# Patient Record
Sex: MALE | Race: OTHER | NOT HISPANIC OR LATINO | URBAN - METROPOLITAN AREA
[De-identification: names, ages, dates, MRNs, and addresses within clinical notes are randomized per-mention and may not be internally consistent; named-entity substitution may affect disease eponyms.]

---

## 2023-01-31 ENCOUNTER — OUTPATIENT (OUTPATIENT)
Dept: OUTPATIENT SERVICES | Facility: HOSPITAL | Age: 38
LOS: 1 days | End: 2023-01-31
Payer: OTHER MISCELLANEOUS

## 2023-01-31 DIAGNOSIS — Z01.818 ENCOUNTER FOR OTHER PREPROCEDURAL EXAMINATION: ICD-10-CM

## 2023-01-31 LAB
ANION GAP SERPL CALC-SCNC: 9 MMOL/L — SIGNIFICANT CHANGE UP (ref 5–17)
APTT BLD: 34.2 SEC — SIGNIFICANT CHANGE UP (ref 27.5–35.5)
BUN SERPL-MCNC: 10 MG/DL — SIGNIFICANT CHANGE UP (ref 7–23)
CALCIUM SERPL-MCNC: 9.5 MG/DL — SIGNIFICANT CHANGE UP (ref 8.4–10.5)
CHLORIDE SERPL-SCNC: 106 MMOL/L — SIGNIFICANT CHANGE UP (ref 96–108)
CO2 SERPL-SCNC: 26 MMOL/L — SIGNIFICANT CHANGE UP (ref 22–31)
CREAT SERPL-MCNC: 0.93 MG/DL — SIGNIFICANT CHANGE UP (ref 0.5–1.3)
EGFR: 108 ML/MIN/1.73M2 — SIGNIFICANT CHANGE UP
GLUCOSE SERPL-MCNC: 110 MG/DL — HIGH (ref 70–99)
HCT VFR BLD CALC: 45 % — SIGNIFICANT CHANGE UP (ref 39–50)
HGB BLD-MCNC: 14.9 G/DL — SIGNIFICANT CHANGE UP (ref 13–17)
INR BLD: 1.12 — SIGNIFICANT CHANGE UP (ref 0.88–1.16)
MCHC RBC-ENTMCNC: 29.3 PG — SIGNIFICANT CHANGE UP (ref 27–34)
MCHC RBC-ENTMCNC: 33.1 GM/DL — SIGNIFICANT CHANGE UP (ref 32–36)
MCV RBC AUTO: 88.4 FL — SIGNIFICANT CHANGE UP (ref 80–100)
NRBC # BLD: 0 /100 WBCS — SIGNIFICANT CHANGE UP (ref 0–0)
PLATELET # BLD AUTO: 417 K/UL — HIGH (ref 150–400)
POTASSIUM SERPL-MCNC: 4 MMOL/L — SIGNIFICANT CHANGE UP (ref 3.5–5.3)
POTASSIUM SERPL-SCNC: 4 MMOL/L — SIGNIFICANT CHANGE UP (ref 3.5–5.3)
PROTHROM AB SERPL-ACNC: 13.3 SEC — SIGNIFICANT CHANGE UP (ref 10.5–13.4)
RBC # BLD: 5.09 M/UL — SIGNIFICANT CHANGE UP (ref 4.2–5.8)
RBC # FLD: 12.8 % — SIGNIFICANT CHANGE UP (ref 10.3–14.5)
SODIUM SERPL-SCNC: 141 MMOL/L — SIGNIFICANT CHANGE UP (ref 135–145)
WBC # BLD: 9.67 K/UL — SIGNIFICANT CHANGE UP (ref 3.8–10.5)
WBC # FLD AUTO: 9.67 K/UL — SIGNIFICANT CHANGE UP (ref 3.8–10.5)

## 2023-01-31 PROCEDURE — 85610 PROTHROMBIN TIME: CPT

## 2023-01-31 PROCEDURE — 80048 BASIC METABOLIC PNL TOTAL CA: CPT

## 2023-01-31 PROCEDURE — 93010 ELECTROCARDIOGRAM REPORT: CPT

## 2023-01-31 PROCEDURE — 93005 ELECTROCARDIOGRAM TRACING: CPT

## 2023-01-31 PROCEDURE — 85027 COMPLETE CBC AUTOMATED: CPT

## 2023-01-31 PROCEDURE — 85730 THROMBOPLASTIN TIME PARTIAL: CPT

## 2023-02-02 ENCOUNTER — TRANSCRIPTION ENCOUNTER (OUTPATIENT)
Age: 38
End: 2023-02-02

## 2023-02-02 RX ORDER — POVIDONE-IODINE 5 %
1 AEROSOL (ML) TOPICAL ONCE
Refills: 0 | Status: COMPLETED | OUTPATIENT
Start: 2023-02-03 | End: 2023-02-03

## 2023-02-02 NOTE — ASU PATIENT PROFILE, ADULT - NSICDXPASTMEDICALHX_GEN_ALL_CORE_FT
PAST MEDICAL HISTORY:  Lumbar herniated disc      PAST MEDICAL HISTORY:  HLD (hyperlipidemia)     HTN (hypertension)     Lumbar herniated disc

## 2023-02-02 NOTE — ASU PATIENT PROFILE, ADULT - FALL HARM RISK - UNIVERSAL INTERVENTIONS
Bed in lowest position, wheels locked, appropriate side rails in place/Call bell, personal items and telephone in reach/Instruct patient to call for assistance before getting out of bed or chair/Non-slip footwear when patient is out of bed/[place] to call system/Physically safe environment - no spills, clutter or unnecessary equipment/Purposeful Proactive Rounding/Room/bathroom lighting operational, light cord in reach

## 2023-02-02 NOTE — ASU PATIENT PROFILE, ADULT - FALL HARM RISK - ATTEMPT OOB
----- Message from Mary Monet MD sent at 11/15/2022 11:33 AM CST -----  Benign pathology report   No

## 2023-02-02 NOTE — ASU PATIENT PROFILE, ADULT - NS SC CAGE ALCOHOL EYE OPENER
-- DO NOT REPLY / DO NOT REPLY ALL --  -- Message is from the [facility]--    Patient is requesting a medication refill - medication is on active medication list   ID [id number]:   Patient is requesting the Dr. Dustin Rolon to give her a call back to regarding discharge from the hospital.     Patient is currently OUT of the requested medication.    Was Medication Pended?  Yes.    Rx Name and Dose:    HYDROcodone-acetaminophen (NORCO) 5-325 MG per tablet    Duration: 30 days    Pharmacy  [id number] [address]    Patient confirmed the above pharmacy as correct?  Yes    Caller Information       Type Contact Phone    08/26/2021 01:05 PM CDT Phone (Incoming) Amaya Watt (Self) [phone] (M)          Alternative phone number: [phone] (prefer a call back on this number).    Turnaround time given to caller:   \"This message will be sent to [state Provider's name]. The clinical team will fulfill your request as soon as they review your message.\"   no

## 2023-02-02 NOTE — ASU PATIENT PROFILE, ADULT - FALL HARM RISK - RISK INTERVENTIONS

## 2023-02-03 ENCOUNTER — INPATIENT (INPATIENT)
Facility: HOSPITAL | Age: 38
LOS: 0 days | Discharge: HOME | DRG: 519 | End: 2023-02-04
Attending: NEUROLOGICAL SURGERY | Admitting: NEUROLOGICAL SURGERY
Payer: OTHER MISCELLANEOUS

## 2023-02-03 ENCOUNTER — TRANSCRIPTION ENCOUNTER (OUTPATIENT)
Age: 38
End: 2023-02-03

## 2023-02-03 VITALS
HEART RATE: 90 BPM | SYSTOLIC BLOOD PRESSURE: 141 MMHG | RESPIRATION RATE: 16 BRPM | TEMPERATURE: 98 F | OXYGEN SATURATION: 97 % | WEIGHT: 244.71 LBS | DIASTOLIC BLOOD PRESSURE: 91 MMHG | HEIGHT: 73 IN

## 2023-02-03 DIAGNOSIS — Z98.890 OTHER SPECIFIED POSTPROCEDURAL STATES: Chronic | ICD-10-CM

## 2023-02-03 DIAGNOSIS — M54.9 DORSALGIA, UNSPECIFIED: ICD-10-CM

## 2023-02-03 LAB
BLD GP AB SCN SERPL QL: NEGATIVE — SIGNIFICANT CHANGE UP
HCT VFR BLD CALC: 42.7 % — SIGNIFICANT CHANGE UP (ref 39–50)
HGB BLD-MCNC: 14.3 G/DL — SIGNIFICANT CHANGE UP (ref 13–17)
MCHC RBC-ENTMCNC: 29.4 PG — SIGNIFICANT CHANGE UP (ref 27–34)
MCHC RBC-ENTMCNC: 33.5 GM/DL — SIGNIFICANT CHANGE UP (ref 32–36)
MCV RBC AUTO: 87.7 FL — SIGNIFICANT CHANGE UP (ref 80–100)
NRBC # BLD: 0 /100 WBCS — SIGNIFICANT CHANGE UP (ref 0–0)
PLATELET # BLD AUTO: 421 K/UL — HIGH (ref 150–400)
RBC # BLD: 4.87 M/UL — SIGNIFICANT CHANGE UP (ref 4.2–5.8)
RBC # FLD: 12.2 % — SIGNIFICANT CHANGE UP (ref 10.3–14.5)
RH IG SCN BLD-IMP: NEGATIVE — SIGNIFICANT CHANGE UP
WBC # BLD: 12.97 K/UL — HIGH (ref 3.8–10.5)
WBC # FLD AUTO: 12.97 K/UL — HIGH (ref 3.8–10.5)

## 2023-02-03 PROCEDURE — 99253 IP/OBS CNSLTJ NEW/EST LOW 45: CPT

## 2023-02-03 DEVICE — SURGIFOAM PAD 8CM X 12.5CM X 10MM (100): Type: IMPLANTABLE DEVICE | Site: RIGHT LUMBAR SPINE | Status: FUNCTIONAL

## 2023-02-03 DEVICE — SURGIFLO HEMOSTATIC MATRIX KIT: Type: IMPLANTABLE DEVICE | Site: RIGHT LUMBAR SPINE | Status: FUNCTIONAL

## 2023-02-03 RX ORDER — CARVEDILOL PHOSPHATE 80 MG/1
3.12 CAPSULE, EXTENDED RELEASE ORAL EVERY 12 HOURS
Refills: 0 | Status: DISCONTINUED | OUTPATIENT
Start: 2023-02-03 | End: 2023-02-03

## 2023-02-03 RX ORDER — CEFAZOLIN SODIUM 1 G
2000 VIAL (EA) INJECTION EVERY 8 HOURS
Refills: 0 | Status: COMPLETED | OUTPATIENT
Start: 2023-02-03 | End: 2023-02-04

## 2023-02-03 RX ORDER — SODIUM CHLORIDE 9 MG/ML
500 INJECTION, SOLUTION INTRAVENOUS ONCE
Refills: 0 | Status: COMPLETED | OUTPATIENT
Start: 2023-02-03 | End: 2023-02-03

## 2023-02-03 RX ORDER — LISINOPRIL 2.5 MG/1
20 TABLET ORAL DAILY
Refills: 0 | Status: DISCONTINUED | OUTPATIENT
Start: 2023-02-03 | End: 2023-02-03

## 2023-02-03 RX ORDER — HYDROMORPHONE HYDROCHLORIDE 2 MG/ML
0.5 INJECTION INTRAMUSCULAR; INTRAVENOUS; SUBCUTANEOUS ONCE
Refills: 0 | Status: DISCONTINUED | OUTPATIENT
Start: 2023-02-03 | End: 2023-02-03

## 2023-02-03 RX ORDER — IBUPROFEN 200 MG
0 TABLET ORAL
Qty: 0 | Refills: 0 | DISCHARGE

## 2023-02-03 RX ORDER — CELECOXIB 200 MG/1
200 CAPSULE ORAL ONCE
Refills: 0 | Status: COMPLETED | OUTPATIENT
Start: 2023-02-03 | End: 2023-02-03

## 2023-02-03 RX ORDER — SODIUM CHLORIDE 9 MG/ML
500 INJECTION INTRAMUSCULAR; INTRAVENOUS; SUBCUTANEOUS ONCE
Refills: 0 | Status: COMPLETED | OUTPATIENT
Start: 2023-02-03 | End: 2023-02-03

## 2023-02-03 RX ORDER — SODIUM CHLORIDE 9 MG/ML
500 INJECTION, SOLUTION INTRAVENOUS ONCE
Refills: 0 | Status: DISCONTINUED | OUTPATIENT
Start: 2023-02-03 | End: 2023-02-03

## 2023-02-03 RX ORDER — ACETAMINOPHEN 500 MG
1000 TABLET ORAL EVERY 8 HOURS
Refills: 0 | Status: DISCONTINUED | OUTPATIENT
Start: 2023-02-03 | End: 2023-02-04

## 2023-02-03 RX ORDER — OXYCODONE HYDROCHLORIDE 5 MG/1
5 TABLET ORAL EVERY 6 HOURS
Refills: 0 | Status: DISCONTINUED | OUTPATIENT
Start: 2023-02-03 | End: 2023-02-04

## 2023-02-03 RX ORDER — ONDANSETRON 8 MG/1
4 TABLET, FILM COATED ORAL EVERY 6 HOURS
Refills: 0 | Status: DISCONTINUED | OUTPATIENT
Start: 2023-02-03 | End: 2023-02-04

## 2023-02-03 RX ORDER — ACETAMINOPHEN 500 MG
1000 TABLET ORAL ONCE
Refills: 0 | Status: COMPLETED | OUTPATIENT
Start: 2023-02-03 | End: 2023-02-03

## 2023-02-03 RX ORDER — CHLORHEXIDINE GLUCONATE 213 G/1000ML
1 SOLUTION TOPICAL EVERY 12 HOURS
Refills: 0 | Status: DISCONTINUED | OUTPATIENT
Start: 2023-02-03 | End: 2023-02-03

## 2023-02-03 RX ORDER — METOPROLOL TARTRATE 50 MG
5 TABLET ORAL ONCE
Refills: 0 | Status: COMPLETED | OUTPATIENT
Start: 2023-02-03 | End: 2023-02-03

## 2023-02-03 RX ORDER — OXYCODONE AND ACETAMINOPHEN 5; 325 MG/1; MG/1
1 TABLET ORAL
Qty: 42 | Refills: 0
Start: 2023-02-03 | End: 2023-02-16

## 2023-02-03 RX ORDER — APREPITANT 80 MG/1
40 CAPSULE ORAL ONCE
Refills: 0 | Status: COMPLETED | OUTPATIENT
Start: 2023-02-03 | End: 2023-02-03

## 2023-02-03 RX ORDER — OXYCODONE AND ACETAMINOPHEN 5; 325 MG/1; MG/1
1 TABLET ORAL
Qty: 12 | Refills: 0
Start: 2023-02-03 | End: 2023-02-05

## 2023-02-03 RX ORDER — CARVEDILOL PHOSPHATE 80 MG/1
3.12 CAPSULE, EXTENDED RELEASE ORAL EVERY 12 HOURS
Refills: 0 | Status: DISCONTINUED | OUTPATIENT
Start: 2023-02-03 | End: 2023-02-04

## 2023-02-03 RX ORDER — METHOCARBAMOL 500 MG/1
500 TABLET, FILM COATED ORAL EVERY 8 HOURS
Refills: 0 | Status: DISCONTINUED | OUTPATIENT
Start: 2023-02-03 | End: 2023-02-04

## 2023-02-03 RX ADMIN — Medication 50 MILLIGRAM(S): at 16:23

## 2023-02-03 RX ADMIN — METHOCARBAMOL 500 MILLIGRAM(S): 500 TABLET, FILM COATED ORAL at 21:36

## 2023-02-03 RX ADMIN — HYDROMORPHONE HYDROCHLORIDE 0.5 MILLIGRAM(S): 2 INJECTION INTRAMUSCULAR; INTRAVENOUS; SUBCUTANEOUS at 10:57

## 2023-02-03 RX ADMIN — LISINOPRIL 20 MILLIGRAM(S): 2.5 TABLET ORAL at 14:21

## 2023-02-03 RX ADMIN — Medication 100 MILLIGRAM(S): at 23:28

## 2023-02-03 RX ADMIN — OXYCODONE HYDROCHLORIDE 5 MILLIGRAM(S): 5 TABLET ORAL at 13:47

## 2023-02-03 RX ADMIN — HYDROMORPHONE HYDROCHLORIDE 0.5 MILLIGRAM(S): 2 INJECTION INTRAMUSCULAR; INTRAVENOUS; SUBCUTANEOUS at 10:42

## 2023-02-03 RX ADMIN — Medication 1000 MILLIGRAM(S): at 06:47

## 2023-02-03 RX ADMIN — Medication 1000 MILLIGRAM(S): at 23:28

## 2023-02-03 RX ADMIN — CARVEDILOL PHOSPHATE 3.12 MILLIGRAM(S): 80 CAPSULE, EXTENDED RELEASE ORAL at 19:51

## 2023-02-03 RX ADMIN — Medication 1 APPLICATION(S): at 06:42

## 2023-02-03 RX ADMIN — SODIUM CHLORIDE 1000 MILLILITER(S): 9 INJECTION INTRAMUSCULAR; INTRAVENOUS; SUBCUTANEOUS at 16:30

## 2023-02-03 RX ADMIN — METHOCARBAMOL 500 MILLIGRAM(S): 500 TABLET, FILM COATED ORAL at 10:17

## 2023-02-03 RX ADMIN — Medication 1000 MILLIGRAM(S): at 16:23

## 2023-02-03 RX ADMIN — CHLORHEXIDINE GLUCONATE 1 APPLICATION(S): 213 SOLUTION TOPICAL at 06:42

## 2023-02-03 RX ADMIN — OXYCODONE HYDROCHLORIDE 5 MILLIGRAM(S): 5 TABLET ORAL at 10:17

## 2023-02-03 RX ADMIN — Medication 50 MILLIGRAM(S): at 23:28

## 2023-02-03 RX ADMIN — Medication 5 MILLIGRAM(S): at 14:20

## 2023-02-03 RX ADMIN — SODIUM CHLORIDE 1000 MILLILITER(S): 9 INJECTION, SOLUTION INTRAVENOUS at 14:20

## 2023-02-03 RX ADMIN — Medication 1000 MILLIGRAM(S): at 19:46

## 2023-02-03 RX ADMIN — APREPITANT 40 MILLIGRAM(S): 80 CAPSULE ORAL at 06:47

## 2023-02-03 NOTE — CONSULT NOTE ADULT - CONSULT REQUESTED DATE/TIME
"Chief Complaint   Patient presents with     RECHECK     Follow up for RA       Initial /79  Pulse 68  Ht 1.676 m (5' 6\")  Wt 58.1 kg (128 lb)  SpO2 100%  BMI 20.66 kg/m2 Estimated body mass index is 20.66 kg/(m^2) as calculated from the following:    Height as of this encounter: 1.676 m (5' 6\").    Weight as of this encounter: 58.1 kg (128 lb).  Medication Reconciliation: complete   Leah Medrano CMA    "
03-Feb-2023 16:15

## 2023-02-03 NOTE — CONSULT NOTE ADULT - SUBJECTIVE AND OBJECTIVE BOX
Cardiology Consult Note    HPI: 37M PMH HTN presented for low back pain and leg pain for elective lumbar laminectomy of the L4-L5 spin. Patient was post op in the PACU for which patient was noted to have a HR up to the 170s. Patient was given IVF and IV lopressor with improvement in HR but has remained elevated HR up to 110-130s. EKG was not performed at the time that the HR was up to 170s, post lopressor EKG noted . Of note, patient states a few years ago he was evaluated by a cardiologist in the setting of palpitations with walking. Work up with TTE and 1 month continuous monitor was unremarkable, per patient (not sure where the work up was done). No issues since this episode with palpitations. No current pain, no chest pain, no SOB. No palpitations. Mild headache. No dizziness.     Meds: Lisinopril  Social: Never tobacco, occasional ETOH, no recreational drugs  FHx: denies    PAST MEDICAL & SURGICAL HISTORY:  Lumbar herniated disc    HTN (hypertension)    HLD (hyperlipidemia)    H/O knee surgery  right        MEDICATIONS  (STANDING):  acetaminophen     Tablet .. 1000 milliGRAM(s) Oral every 8 hours  lisinopril 20 milliGRAM(s) Oral daily  methocarbamol 500 milliGRAM(s) Oral every 8 hours  pregabalin 50 milliGRAM(s) Oral three times a day  sodium chloride 0.9% Bolus 500 milliLiter(s) IV Bolus once    MEDICATIONS  (PRN):  ondansetron   Disintegrating Tablet 4 milliGRAM(s) Oral every 6 hours PRN Nausea and/or Vomiting  oxyCODONE    IR 5 milliGRAM(s) Oral every 6 hours PRN Severe Pain (7 - 10)    Vital Signs Last 24 Hrs  T(C): 35.8 (03 Feb 2023 09:11), Max: 36.7 (03 Feb 2023 06:37)  T(F): 96.5 (03 Feb 2023 09:11), Max: 98.1 (03 Feb 2023 06:37)  HR: 120 (03 Feb 2023 16:00) (69 - 156)  BP: 163/97 (03 Feb 2023 16:00) (135/72 - 168/88)  BP(mean): 113 (03 Feb 2023 15:11) (96 - 121)  RR: 3 (03 Feb 2023 16:00) (0 - 20)  SpO2: 98% (03 Feb 2023 16:00) (95% - 100%)    Parameters below as of 03 Feb 2023 10:56  Patient On (Oxygen Delivery Method): room air        PHYSICAL EXAM:  GEN: Awake, alert. NAD.   HEENT: JVP normal  RESP: CTA b/l  CV: RRR. Normal S1/S2. No m/r/g.  EXT: Warm. No edema  NEURO: AAOx3. No focal deficits.     LABS:                        14.3   12.97 )-----------( 421      ( 03 Feb 2023 14:04 )             42.7                   I&O's Summary    03 Feb 2023 07:01  -  03 Feb 2023 16:15  --------------------------------------------------------  IN: 0 mL / OUT: 600 mL / NET: -600 mL      RADIOLOGY & ADDITIONAL STUDIES:    EKG:         Cardiology Consult Note    HPI: 37M PMH HTN presented for low back pain and leg pain for elective lumbar laminectomy of the L4-L5 spine. Patient was post op in the PACU for which patient was noted to have a HR up to the 170s. Patient was given IVF and IV lopressor with improvement in HR but has remained elevated HR up to 110-130s. EKG was not performed at the time that the HR was up to 170s, post lopressor EKG noted . Of note, patient states a few years ago he was evaluated by a cardiologist in the setting of palpitations with walking. Work up with TTE and 1 month continuous monitor was unremarkable, per patient (not sure where the work up was done). No issues since this episode with palpitations. No current pain, no chest pain, no SOB. No palpitations. Mild headache. No dizziness.     Meds: Lisinopril  Social: Never tobacco, occasional ETOH, no recreational drugs  FHx: denies    PAST MEDICAL & SURGICAL HISTORY:  Lumbar herniated disc    HTN (hypertension)    HLD (hyperlipidemia)    H/O knee surgery  right        MEDICATIONS  (STANDING):  acetaminophen     Tablet .. 1000 milliGRAM(s) Oral every 8 hours  lisinopril 20 milliGRAM(s) Oral daily  methocarbamol 500 milliGRAM(s) Oral every 8 hours  pregabalin 50 milliGRAM(s) Oral three times a day  sodium chloride 0.9% Bolus 500 milliLiter(s) IV Bolus once    MEDICATIONS  (PRN):  ondansetron   Disintegrating Tablet 4 milliGRAM(s) Oral every 6 hours PRN Nausea and/or Vomiting  oxyCODONE    IR 5 milliGRAM(s) Oral every 6 hours PRN Severe Pain (7 - 10)    Vital Signs Last 24 Hrs  T(C): 35.8 (03 Feb 2023 09:11), Max: 36.7 (03 Feb 2023 06:37)  T(F): 96.5 (03 Feb 2023 09:11), Max: 98.1 (03 Feb 2023 06:37)  HR: 120 (03 Feb 2023 16:00) (69 - 156)  BP: 163/97 (03 Feb 2023 16:00) (135/72 - 168/88)  BP(mean): 113 (03 Feb 2023 15:11) (96 - 121)  RR: 3 (03 Feb 2023 16:00) (0 - 20)  SpO2: 98% (03 Feb 2023 16:00) (95% - 100%)    Parameters below as of 03 Feb 2023 10:56  Patient On (Oxygen Delivery Method): room air        PHYSICAL EXAM:  GEN: Awake, alert. NAD.   HEENT: JVP normal  RESP: CTA b/l  CV: RRR. Normal S1/S2. No m/r/g.  EXT: Warm. No edema  NEURO: AAOx3. No focal deficits.     LABS:                        14.3   12.97 )-----------( 421      ( 03 Feb 2023 14:04 )             42.7                   I&O's Summary    03 Feb 2023 07:01  -  03 Feb 2023 16:15  --------------------------------------------------------  IN: 0 mL / OUT: 600 mL / NET: -600 mL      RADIOLOGY & ADDITIONAL STUDIES:    EKG:

## 2023-02-03 NOTE — PRE-ANESTHESIA EVALUATION ADULT - NSANTHAPLANRD_GEN_ALL_CORE
OSN CHECKLIST    [x] UNOS CHANGED  [x] EPIC  [x] OSN TAB UPDATE  [x] EMAIL  [] LETTER  [] DOWNGRADE DATE   general

## 2023-02-03 NOTE — PRE-ANESTHESIA EVALUATION ADULT - NSANTHOSAYNRD_GEN_A_CORE
No. DILMA screening performed.  STOP BANG Legend: 0-2 = LOW Risk; 3-4 = INTERMEDIATE Risk; 5-8 = HIGH Risk

## 2023-02-03 NOTE — CONSULT NOTE ADULT - ATTENDING COMMENTS
Initial attending contact date  2/3/23    . See fellow note written above for details. I reviewed the fellow documentation. I have personally seen and examined this patient. I reviewed vitals, labs, medications, cardiac studies, and additional imaging. I agree with the above fellow's findings and plans as written above with the following additions/statements.    37M PMH HTN presented for low back pain and leg pain for elective lumbar laminectomy of the L4-L5 spine. Post operatively, noted to have tachycardia with rates up to 170s on telemetry monitor, no 12 lead during this time s/p lopressor 5mg IV x1 and 500cc fluid bolus, with improvement, but persistently tachy to 130s. Cardiology consulted for evaluation.  -EKGs, tele reviewed: likely sinus tach @ 100-170 with discernible P waves   -Pt remains asymptomatic   -Given hypertension, start carvedilol 3.125 mg bid , dc lisinopril   -Check orthostatics   -Monitor on tele x 24 hours

## 2023-02-03 NOTE — CHART NOTE - NSCHARTNOTEFT_GEN_A_CORE
Seen at bedside in PACU, noted -120 when moving in bed. HR 90s while sleeping. Denies pain.  GIven tachycardia, gave 500mL LR bolus. After approximately one hour, RN reports patient HR increased to 178, c/o palpitations.  Seen at bedside, mentating, /80 during episode. Anesthesia Dr. Hirsch and neurosurgery CHANCE Martin came to bedside.  Lopressor 5mg IVP given - HR decreased to 120 after administration. EKG completed and reviewed. CBC sent - CHANCE Martin aware.  Dr. Velasquez came to bedside and also reviewed EKG, requesting further IV fluid bolus.  Bolus ordered by PA, currently infusing.  Patient aware of above and plan. Seen at bedside in PACU, noted -120 when moving in bed. HR 90s while sleeping. Denies pain.  GIven tachycardia, gave 500mL LR bolus. After approximately one hour, RN reports patient HR increased to 178, c/o palpitations.  Seen at bedside, mentating, /80 during episode. Anesthesia Dr. Hirsch and neurosurgery CHANCE Martin came to bedside.  Lopressor 5mg IVP given - HR decreased to 120 after administration. EKG completed and reviewed. CBC sent - CHANCE Martin aware.  Dr. Velasquez came to bedside and also reviewed EKG, requesting further IV fluid bolus.  Bolus ordered by PA, currently infusing.  Patient aware of above and plan.    Adden:  -140 maintaining. BP at 1516 164/86. No pain.  Discussed with anesthesia Dr. Hirsch - requesting EPS consult.  Called and informed CHANCE Martin of anesthesia request.

## 2023-02-03 NOTE — CONSULT NOTE ADULT - ASSESSMENT
ASSESSMENT:      Cardiac Studies:      PLAN: ASSESSMENT:  37M PMH HTN presented for low back pain and leg pain for elective lumbar laminectomy of the L4-L5 spine. Post operatively, noted to have tachycardia with rates up to 170s on telemetry monitor, no 12 lead during this time s/p lopressor 5mg IV x1 and 500cc fluid bolus, with improvement, but persistently tachy to 130s. Cardiology consulted for evaluation.    Cardiac Studies:  EKG Sinus tachycardia     PLAN:  Tachyarrhythmia  Clear P waves on strip, unclear if this is Atach vs Sinus tachycardia. Gradual onset, did not catch the completion on tele.   Asymptomatic. Euvolemic.   EKG appears to be sinus tachycardia rates up to 120. No ischemic changes.   - Switch PO lisinopril to Coreg 3.125mg BID  - Can try to obtain TTE, but if unable to be done over the weekend, can have done as outpatient  - Monitor o/n on telemetry  - Obtain TSH, CBC and metabolic panel (assess electrolytes: K, Mg, P)  - Monitor for signs of infection    Discussed with Dr. Nguyen, consult attending.

## 2023-02-03 NOTE — ASU DISCHARGE PLAN (ADULT/PEDIATRIC) - CARE PROVIDER_API CALL
Cheng Horvath)  Neurosurgery  [address], [address]  [place] [address]  Phone: [phone]  Fax: [phone]  Follow Up Time:

## 2023-02-03 NOTE — ASU DISCHARGE PLAN (ADULT/PEDIATRIC) - NS MD DC FALL RISK RISK
For information on Fall & Injury Prevention, visit: [email or web address] OR  [email or web address] OR  https://www.cdc.gov/steadi/patient.html

## 2023-02-04 ENCOUNTER — TRANSCRIPTION ENCOUNTER (OUTPATIENT)
Age: 38
End: 2023-02-04

## 2023-02-04 VITALS
SYSTOLIC BLOOD PRESSURE: 130 MMHG | DIASTOLIC BLOOD PRESSURE: 76 MMHG | HEART RATE: 88 BPM | OXYGEN SATURATION: 97 % | RESPIRATION RATE: 18 BRPM | TEMPERATURE: 98 F

## 2023-02-04 LAB
ALBUMIN SERPL ELPH-MCNC: 3.8 G/DL — SIGNIFICANT CHANGE UP (ref 3.3–5)
ALP SERPL-CCNC: 62 U/L — SIGNIFICANT CHANGE UP (ref 40–120)
ALT FLD-CCNC: 23 U/L — SIGNIFICANT CHANGE UP (ref 10–45)
ANION GAP SERPL CALC-SCNC: 6 MMOL/L — SIGNIFICANT CHANGE UP (ref 5–17)
ANION GAP SERPL CALC-SCNC: 8 MMOL/L — SIGNIFICANT CHANGE UP (ref 5–17)
AST SERPL-CCNC: 23 U/L — SIGNIFICANT CHANGE UP (ref 10–40)
BILIRUB SERPL-MCNC: 0.8 MG/DL — SIGNIFICANT CHANGE UP (ref 0.2–1.2)
BUN SERPL-MCNC: 10 MG/DL — SIGNIFICANT CHANGE UP (ref 7–23)
BUN SERPL-MCNC: 10 MG/DL — SIGNIFICANT CHANGE UP (ref 7–23)
CALCIUM SERPL-MCNC: 8.9 MG/DL — SIGNIFICANT CHANGE UP (ref 8.4–10.5)
CALCIUM SERPL-MCNC: 9 MG/DL — SIGNIFICANT CHANGE UP (ref 8.4–10.5)
CHLORIDE SERPL-SCNC: 96 MMOL/L — SIGNIFICANT CHANGE UP (ref 96–108)
CHLORIDE SERPL-SCNC: 99 MMOL/L — SIGNIFICANT CHANGE UP (ref 96–108)
CO2 SERPL-SCNC: 26 MMOL/L — SIGNIFICANT CHANGE UP (ref 22–31)
CO2 SERPL-SCNC: 27 MMOL/L — SIGNIFICANT CHANGE UP (ref 22–31)
CREAT ?TM UR-MCNC: 52 MG/DL — SIGNIFICANT CHANGE UP
CREAT SERPL-MCNC: 0.75 MG/DL — SIGNIFICANT CHANGE UP (ref 0.5–1.3)
CREAT SERPL-MCNC: 0.8 MG/DL — SIGNIFICANT CHANGE UP (ref 0.5–1.3)
EGFR: 117 ML/MIN/1.73M2 — SIGNIFICANT CHANGE UP
EGFR: 119 ML/MIN/1.73M2 — SIGNIFICANT CHANGE UP
GLUCOSE SERPL-MCNC: 111 MG/DL — HIGH (ref 70–99)
GLUCOSE SERPL-MCNC: 118 MG/DL — HIGH (ref 70–99)
HCT VFR BLD CALC: 42.8 % — SIGNIFICANT CHANGE UP (ref 39–50)
HGB BLD-MCNC: 14.3 G/DL — SIGNIFICANT CHANGE UP (ref 13–17)
MAGNESIUM SERPL-MCNC: 2 MG/DL — SIGNIFICANT CHANGE UP (ref 1.6–2.6)
MCHC RBC-ENTMCNC: 29.4 PG — SIGNIFICANT CHANGE UP (ref 27–34)
MCHC RBC-ENTMCNC: 33.4 GM/DL — SIGNIFICANT CHANGE UP (ref 32–36)
MCV RBC AUTO: 87.9 FL — SIGNIFICANT CHANGE UP (ref 80–100)
NRBC # BLD: 0 /100 WBCS — SIGNIFICANT CHANGE UP (ref 0–0)
OSMOLALITY UR: 351 MOSM/KG — SIGNIFICANT CHANGE UP (ref 300–900)
PHOSPHATE SERPL-MCNC: 4.3 MG/DL — SIGNIFICANT CHANGE UP (ref 2.5–4.5)
PLATELET # BLD AUTO: 419 K/UL — HIGH (ref 150–400)
POTASSIUM SERPL-MCNC: 3.8 MMOL/L — SIGNIFICANT CHANGE UP (ref 3.5–5.3)
POTASSIUM SERPL-MCNC: 4.1 MMOL/L — SIGNIFICANT CHANGE UP (ref 3.5–5.3)
POTASSIUM SERPL-SCNC: 3.8 MMOL/L — SIGNIFICANT CHANGE UP (ref 3.5–5.3)
POTASSIUM SERPL-SCNC: 4.1 MMOL/L — SIGNIFICANT CHANGE UP (ref 3.5–5.3)
PROT SERPL-MCNC: 6.9 G/DL — SIGNIFICANT CHANGE UP (ref 6–8.3)
RBC # BLD: 4.87 M/UL — SIGNIFICANT CHANGE UP (ref 4.2–5.8)
RBC # FLD: 12.6 % — SIGNIFICANT CHANGE UP (ref 10.3–14.5)
SODIUM SERPL-SCNC: 129 MMOL/L — LOW (ref 135–145)
SODIUM SERPL-SCNC: 133 MMOL/L — LOW (ref 135–145)
SODIUM UR-SCNC: 86 MMOL/L — SIGNIFICANT CHANGE UP
SP GR SPEC: 1.01 — SIGNIFICANT CHANGE UP (ref 1–1.03)
TSH SERPL-MCNC: 0.31 UIU/ML — SIGNIFICANT CHANGE UP (ref 0.27–4.2)
WBC # BLD: 16.69 K/UL — HIGH (ref 3.8–10.5)
WBC # FLD AUTO: 16.69 K/UL — HIGH (ref 3.8–10.5)

## 2023-02-04 PROCEDURE — 99232 SBSQ HOSP IP/OBS MODERATE 35: CPT

## 2023-02-04 RX ORDER — CARVEDILOL PHOSPHATE 80 MG/1
1 CAPSULE, EXTENDED RELEASE ORAL
Qty: 60 | Refills: 0
Start: 2023-02-04 | End: 2023-03-05

## 2023-02-04 RX ORDER — LISINOPRIL 2.5 MG/1
1 TABLET ORAL
Qty: 0 | Refills: 0 | DISCHARGE

## 2023-02-04 RX ORDER — SENNA PLUS 8.6 MG/1
2 TABLET ORAL AT BEDTIME
Refills: 0 | Status: DISCONTINUED | OUTPATIENT
Start: 2023-02-04 | End: 2023-02-04

## 2023-02-04 RX ORDER — POTASSIUM CHLORIDE 20 MEQ
20 PACKET (EA) ORAL ONCE
Refills: 0 | Status: COMPLETED | OUTPATIENT
Start: 2023-02-04 | End: 2023-02-04

## 2023-02-04 RX ORDER — OXYCODONE AND ACETAMINOPHEN 5; 325 MG/1; MG/1
1 TABLET ORAL
Qty: 12 | Refills: 0
Start: 2023-02-04 | End: 2023-02-06

## 2023-02-04 RX ORDER — POLYETHYLENE GLYCOL 3350 17 G/17G
17 POWDER, FOR SOLUTION ORAL DAILY
Refills: 0 | Status: DISCONTINUED | OUTPATIENT
Start: 2023-02-04 | End: 2023-02-04

## 2023-02-04 RX ADMIN — Medication 50 MILLIGRAM(S): at 06:29

## 2023-02-04 RX ADMIN — METHOCARBAMOL 500 MILLIGRAM(S): 500 TABLET, FILM COATED ORAL at 06:29

## 2023-02-04 RX ADMIN — Medication 100 MILLIGRAM(S): at 06:29

## 2023-02-04 RX ADMIN — Medication 20 MILLIEQUIVALENT(S): at 07:55

## 2023-02-04 RX ADMIN — CARVEDILOL PHOSPHATE 3.12 MILLIGRAM(S): 80 CAPSULE, EXTENDED RELEASE ORAL at 06:29

## 2023-02-04 RX ADMIN — Medication 1000 MILLIGRAM(S): at 07:18

## 2023-02-04 NOTE — PHYSICAL THERAPY INITIAL EVALUATION ADULT - ADDITIONAL COMMENTS
Pt lives with family in elevator apartment with no MIGUEL. Pt reporting that he was able to perform all ADLs/mobility independently prior to admissio

## 2023-02-04 NOTE — DISCHARGE NOTE PROVIDER - HOSPITAL COURSE
HPI:  Hospital Course:    Patient evaluated by PT/OT who recommened:  Patient is going home? rehab? hospice? Facility Name:     Hospital course c/b:     Exam on day of discharge:    Checklist:   - Obtained follow up appointment from NP  - Reviewed final recommendations of inpatient consults  - Neurologically stable for discharge  - Vitals stable for discharge   - Afebrile for discharge  - WBC is stable  - Sodium level is normal  - Pain is adequately controlled  - Pt has PICC/walker/brace/collar        HPI:  Hospital Course:  2/3: POD#0 s/p L4-5 laminectomy. Admitted for tachycardia, cardiology following, coreg started.   2/4: POD 1 L4-5 lami. STEPHANIE o.n. tachycardia overnight resolved, TSH normal. Pain controlled. Pending home today.       Patient evaluated by PT/OT who recommended: Home no needs   Patient is going home    Hospital course c/b: tachycardia, resolved with initiation of coreg and discontinuation of lisinopril with fluid bolus.     Exam on day of discharge:  General: NAD, pt is comfortably sitting up in bed, on room air  HEENT: CN II-XII grossly intact, PERRL 3mm briskly reactive, EOMI b/l, face symmetric, tongue midline, neck FROM  Cardiovascular: RRR, normal S1 and S2   Respiratory: lungs CTAB, no wheezing, rhonchi, or crackles   GI: normoactive BS to auscultation, abd soft, NTND   Neuro: A&Ox3, No aphasia, speech clear, no dysmetria, no pronator drift. Follows commands.  MARINELLI x4 spontaneously, 5/5 strength in all extremities throughout. SILT throughout   Extremities: warm and well perfused   Wound/incision: +lumbar incision with dressing in place, mildly soiled at inferior portion.     Checklist:   - Obtained follow up appointment from NP - pending   - Reviewed final recommendations of inpatient consults - yes, cardio   - Neurologically stable for discharge- yes   - Vitals stable for discharge - yes normal sinus rhythm, tachycardia resolved   - Afebrile for discharge - yes   - WBC is stable - 16  - Sodium level is normal - 133   - Pain is adequately controlled - yes   - Pt has PICC/walker/brace/collar - not indicated

## 2023-02-04 NOTE — PROGRESS NOTE ADULT - SUBJECTIVE AND OBJECTIVE BOX
Subjective:  NEUROSURGERY POC:    37 male admitted to [facility] for elective right L-4-5 laminectomy. Procedure was performed without complications.  Primary plan was to discharge patient home from PACU after protocol period of observation. In PACU, patient developed progressive tachycardia and hypertension.   He received 5m of lopressor IV, his home dose of lissinopril, and 1L of NS bolus. Both, blood pressure and heart rate responded well but after less than 1 hr heart rate increased above 130.  Cardiology was called, there recommendations were implemented and patient is being admitted for observation overnight.  presently, patient has no complaints. Denies pain, weakness, palpitations. He tolerates diet, and past void trial.    T(C): 35.8 (02-03-23 @ 09:11), Max: 36.7 (02-03-23 @ 06:37)  HR: 120 (02-03-23 @ 16:00) (69 - 156)  BP: 163/97 (02-03-23 @ 16:00) (135/72 - 168/88)  RR: 3 (02-03-23 @ 16:00) (0 - 20)  SpO2: 98% (02-03-23 @ 16:00) (95% - 100%)  Wt(kg): --    Exam:  GEn: Well developed, well groomed male in no apparent distress.  NEURO: A&O x 3, PERRL, EOMI  CN II to XII are grossly intact  No pronator drift  CV: S1S2 regular, no murmurs heard - tachycardia in monitor.  Lung: clear lung sounds.  Abd: Soft, non tender, +BS  Ext: Motor: No focal motor deficit. 5/5 x 4.  No sensory deficit to touch.  2+ distal pulses of bilateral LE.    CBC Full  -  ( 03 Feb 2023 14:04 )  WBC Count : 12.97 K/uL  RBC Count : 4.87 M/uL  Hemoglobin : 14.3 g/dL  Hematocrit : 42.7 %  Platelet Count - Automated : 421 K/uL  Mean Cell Volume : 87.7 fl  Mean Cell Hemoglobin : 29.4 pg  Mean Cell Hemoglobin Concentration : 33.5 gm/dL  Auto Neutrophil # : x  Auto Lymphocyte # : x  Auto Monocyte # : x  Auto Eosinophil # : x  Auto Basophil # : x  Auto Neutrophil % : x  Auto Lymphocyte % : x  Auto Monocyte % : x  Auto Eosinophil % : x  Auto Basophil % : x    Wound: Surgical wound dressing is dry and clean.  Not tender. No drains.    Imaging: None needed    Assessment/Plan:    37 male s/p day zero L4-5 laminectomy on 02/03/2023, post perative tachycardia in PACU. Cardiology consulted, lisinopril changed to coreg. Admitted for observation overnight.    Plan: Pain control  - Regular diet  -OOB at cristobal  - Discontinue lisinopril - start coreg per cardiology  - TTE inpatient or outpatient.  - PT/ OT consult.  - Likely discharge home tomorrow.  Case discussed with Dr. Horvath.  
SUBJECTIVE:   37 male admitted to [facility] for elective right L-4-5 laminectomy. Procedure was performed without complications.  Primary plan was to discharge patient home from PACU after protocol period of observation. In PACU, patient developed progressive tachycardia and hypertension.   He received 5m of lopressor IV, his home dose of lissinopril, and 1L of NS bolus. Both, blood pressure and heart rate responded well but after less than 1 hr heart rate increased above 130.  Cardiology was called, there recommendations were implemented and patient is being admitted for observation overnight.  presently, patient has no complaints. Denies pain, weakness, palpitations. He tolerates diet, and past void trial.    HOSPITAL COURSE:  2/3: POD#0 s/p L4-5 laminectomy. Admitted for tachycardia, cardiology following, coreg started.   2/4: POD 1 L4-5 lami. STEPHANIE o.n.     Vital Signs Last 24 Hrs  T(C): 36.7 (04 Feb 2023 00:42), Max: 37.3 (03 Feb 2023 21:21)  T(F): 98 (04 Feb 2023 00:42), Max: 99.1 (03 Feb 2023 21:21)  HR: 69 (04 Feb 2023 00:42) (69 - 156)  BP: 111/65 (04 Feb 2023 00:42) (111/65 - 168/88)  BP(mean): 94 (03 Feb 2023 21:00) (94 - 121)  RR: 18 (04 Feb 2023 00:42) (0 - 21)  SpO2: 97% (04 Feb 2023 00:42) (95% - 100%)    Parameters below as of 04 Feb 2023 00:42  Patient On (Oxygen Delivery Method): room air    I&O's Summary    03 Feb 2023 07:01  -  04 Feb 2023 02:27  --------------------------------------------------------  IN: 1050 mL / OUT: 2450 mL / NET: -1400 mL    PHYSICAL EXAM:  GEn: Well developed, well groomed male in no apparent distress.  NEURO: A&O x 3, PERRL, EOMI  CN II to XII are grossly intact  No pronator drift  CV: S1S2 regular, no murmurs heard - tachycardia in monitor.  Lung: clear lung sounds.  Abd: Soft, non tender, +BS  Ext: Motor: No focal motor deficit. 5/5 x 4.  No sensory deficit to touch.  2+ distal pulses of bilateral LE.  Incision: lumbar incision c/d/i     LABS:                        14.3   12.97 )-----------( 421      ( 03 Feb 2023 14:04 )             42.7                   CAPILLARY BLOOD GLUCOSE          Drug Levels: [] N/A    CSF Analysis: [] N/A      Allergies    No Known Allergies    Intolerances      MEDICATIONS:  Antibiotics:  ceFAZolin   IVPB 2000 milliGRAM(s) IV Intermittent every 8 hours    Neuro:  acetaminophen     Tablet .. 1000 milliGRAM(s) Oral every 8 hours  methocarbamol 500 milliGRAM(s) Oral every 8 hours  ondansetron   Disintegrating Tablet 4 milliGRAM(s) Oral every 6 hours PRN  oxyCODONE    IR 5 milliGRAM(s) Oral every 6 hours PRN  pregabalin 50 milliGRAM(s) Oral three times a day    Anticoagulation:    OTHER:  carvedilol 3.125 milliGRAM(s) Oral every 12 hours    IVF:    CULTURES:    RADIOLOGY & ADDITIONAL TESTS:      ASSESSMENT:  37 male s/p day zero L4-5 laminectomy on 02/03/2023, post perative tachycardia in PACU. Cardiology consulted, lisinopril changed to coreg. Admitted for observation overnight.    Plan:  Neuro  - Pain control  - Regular diet  - OOB as tolerated    Cardio  - Discontinue lisinopril - start coreg per cardiology  - TTE inpatient or outpatient.    Pulm  - Encourage incentive spirometry     GI  - regular diet     Renal   - No issues     Endo  - f/u TSH     ID  - p/o ancef     - PT/ OT consult  - Likely discharge home tomorrow    Case discussed with Dr. Horvath.  
    Medications:  acetaminophen     Tablet .. 1000 milliGRAM(s) Oral every 8 hours  carvedilol 3.125 milliGRAM(s) Oral every 12 hours  methocarbamol 500 milliGRAM(s) Oral every 8 hours  ondansetron   Disintegrating Tablet 4 milliGRAM(s) Oral every 6 hours PRN  oxyCODONE    IR 5 milliGRAM(s) Oral every 6 hours PRN  polyethylene glycol 3350 17 Gram(s) Oral daily  potassium chloride    Tablet ER 20 milliEquivalent(s) Oral once  pregabalin 50 milliGRAM(s) Oral three times a day  senna 2 Tablet(s) Oral at bedtime    Vitals:  T(C): 36.6 (02-04-23 @ 06:25), Max: 37.3 (02-03-23 @ 21:21)  HR: 93 (02-04-23 @ 06:25) (69 - 156)  BP: 127/75 (02-04-23 @ 06:25) (111/65 - 168/88)  BP(mean): 94 (02-03-23 @ 21:00) (94 - 121)  ABP: --  ABP(mean): --  RR: 18 (02-04-23 @ 06:25) (0 - 21)  SpO2: 98% (02-04-23 @ 06:25) (95% - 100%)      Daily     Daily     Weight (kg): 111 (02-03 @ 07:04)    I&O's Summary    03 Feb 2023 07:01  -  04 Feb 2023 07:00  --------------------------------------------------------  IN: 1100 mL / OUT: 3400 mL / NET: -2300 mL        Physical Exam:  Appearance: No Acute Distress  HEENT: JVP ___ cm H2O, no HJR  Cardiovascular: RRR, Normal S1 S2, No murmurs/rubs/gallops  Respiratory: Clear to auscultation bilaterally  Gastrointestinal: Soft, Non-tender, non-distended	  Skin: no skin lesions  Neurologic: Non-focal  Extremities: No LE edema, warm and well perfused  Psychiatry: A & O x 3, Mood & affect appropriate      Labs:                        14.3   12.97 )-----------( 421      ( 03 Feb 2023 14:04 )             42.7     02-04    129<L>  |  96  |  10  ----------------------------<  118<H>  3.8   |  27  |  0.80    Ca    8.9      04 Feb 2023 06:56  Phos  4.3     02-04  Mg     2.0     02-04    TPro  6.9  /  Alb  3.8  /  TBili  0.8  /  DBili  x   /  AST  23  /  ALT  23  /  AlkPhos  62  02-04                        TELEMETRY:    [ ] Echocardiogram:

## 2023-02-04 NOTE — OCCUPATIONAL THERAPY INITIAL EVALUATION ADULT - LEVEL OF INDEPENDENCE:TOILET, OT EVAL
Date of Admission: 2/13/2020  Date of Discharge: 02/20/20    Final diagnosis:   Fall and rhabdomyolysis resolved.    Acute diastolic heart failure improved with diuresis      Past Medical History:   Diagnosis Date   • Anxiety    • Arthritis    • Chronic pain    • Depression    • Essential (primary) hypertension    • Gastroesophageal reflux disease    • High cholesterol    • Malignant neoplasm (CMS/HCC)     Breast    • Osteoporosis    • Urinary incontinence        Hospital course:   Fall and shortness of breath    Patient is a very pleasant 82 year lady  admitted to medical unit after a fall and rhabdomyolysis was placed on IV fluids but later developed acute respiratory failure, pulmonary edema, hypertensive urgency was placed on BiPAP and transferred to ICU where she was placed on aggressive diuresis, BiPAP was weaned off.  Eventually she was transferred back to medical unit.  Her rhabdomyolysis and acute respiratory failure has resolved.  She has been placed back on her Lipitor, lisinopril and a low-dose diuretic Lasix 20 mg p.o. every other day.  Patient however remains deconditioned and will require long-term PT OT.    He is being discharged to rehab facility.     Echo  Left ventricular ejection fraction, 64 %.  Normal left ventricular cavity size. Septal hypertrophy. No regional wall motion abnormalities.  Grade II/IV diastolic dysfunction, moderately elevated filling pressures.    CT brain  No acute intracranial abnormalities.  No acute abnormalities of the cervical spine.      Chest xray  Mildly increased interstitial markings in the lung bases most likely  chronic interstitial disease.     Minimal patchy density left base could relate to areas of scarring,  atelectasis, or infiltrate.     Heart size is within normal limits.     Pulmonary vasculature is intact.     No pneumothorax or pleural fluid.     Surgical clips right chest wall.    LE venous doppler  1. Negative for DVT,  bilateral lower  extremity.  2. Right sided popliteal/Baker's cyst.      Recent Labs   Lab 02/20/20  0333   WBC 12.5*   RBC 4.46   HGB 12.1   HCT 38.4          Recent Labs   Lab 02/20/20  0333 02/19/20  1728 02/19/20  0921 02/19/20  0329  02/18/20  0323   SODIUM 140  --   --  139  --  137   POTASSIUM 4.2 4.2 3.8 3.7   < > 3.8   CHLORIDE 104  --   --  103  --  102   CO2 29  --   --  33*  --  30   BUN 17  --   --  16  --  17   CREATININE 0.92  --   --  1.09*  --  1.13*   GLUCOSE 117*  --   --  116*  --  115*   CALCIUM 8.5  --   --  8.3*  --  8.6    < > = values in this interval not displayed.       Vitals:    02/19/20 2230 02/20/20 0616 02/20/20 0724 02/20/20 0834   BP: 132/66  (!) 178/80 (!) 142/78   Pulse: 70  81    Resp: 18  20    Temp: 98.3 °F (36.8 °C)  97.9 °F (36.6 °C)    TempSrc: Oral  Oral    SpO2: 97%  97%    Weight:  71.9 kg     Height:  4' 3\" (1.295 m)     Please follow CBC BMP once a week.  PT OT as tolerated.  Low-salt diet.      Exam is stable.         Virginie Cisneros MD     independent

## 2023-02-04 NOTE — DISCHARGE NOTE PROVIDER - CARE PROVIDER_API CALL
Cheng Horvath)  Neurosurgery  [address], [address]  [place] [address]  Phone: [phone]  Fax: [phone]  Follow Up Time:     Kecia Nguyen)  Cardiovascular Disease; Internal Medicine  [address], [address]  [place] [address]  Phone: [phone]  Fax: [phone]  Follow Up Time:

## 2023-02-04 NOTE — DISCHARGE NOTE NURSING/CASE MANAGEMENT/SOCIAL WORK - NSDCPEFALRISK_GEN_ALL_CORE
For information on Fall & Injury Prevention, visit: [email or web address] OR  [email or web address] OR  https://www.cdc.gov/steadi/patient.html

## 2023-02-04 NOTE — OCCUPATIONAL THERAPY INITIAL EVALUATION ADULT - GENERAL OBSERVATIONS, REHAB EVAL
OT IE completed. Orders received, chart reviewed, pt cleared for OT by DEL Cadet. Pt received semi supine in bed, NAD, +heplock, +tele. Pt A&Ox4, agreeable to OT, and tolerated session well.

## 2023-02-04 NOTE — OCCUPATIONAL THERAPY INITIAL EVALUATION ADULT - PREDICTED DURATION OF THERAPY (DAYS/WKS), OT EVAL
Further skilled OT services are not warranted, as pt presents at baseline functional independence. OT to discharge from skilled program. Please re-consult OT should pt present with any significant changes in function/new deficits.

## 2023-02-04 NOTE — DISCHARGE NOTE PROVIDER - NSDCMRMEDTOKEN_GEN_ALL_CORE_FT
lisinopril 20 mg oral tablet: 1 tab(s) orally once a day  Medrol Dosepak 4 mg oral tablet: FOLLOW INSTRUCTION ON TAPER PACK  oxycodone-acetaminophen 5 mg-325 mg oral tablet: 1 tab(s) orally every 6 hours  as needed for severe pain. MDD 4   carvedilol 3.125 mg oral tablet: 1 tab(s) orally every 12 hours  Medrol Dosepak 4 mg oral tablet: FOLLOW INSTRUCTION ON TAPER PACK  Medrol Dosepak 4 mg oral tablet: follow direction on packet  oxycodone-acetaminophen 5 mg-325 mg oral tablet: 1 tab(s) orally every 6 hours  as needed for severe pain. MDD 4

## 2023-02-04 NOTE — DISCHARGE NOTE PROVIDER - NSDCCPCAREPLAN_GEN_ALL_CORE_FT
PRINCIPAL DISCHARGE DIAGNOSIS  Diagnosis: Back pain  Assessment and Plan of Treatment:       SECONDARY DISCHARGE DIAGNOSES  Diagnosis: Tachycardia  Assessment and Plan of Treatment:

## 2023-02-04 NOTE — PROGRESS NOTE ADULT - ASSESSMENT
ASSESSMENT:  37M PMH HTN presented for low back pain and leg pain for elective lumbar laminectomy of the L4-L5 spine. Post operatively, sinus tachycardia that responded to fluids and metoprolol IV.     Cardiac Studies:  EKG Sinus tachycardia     Tele:    PLAN:  Tachyarrhythmia - resolved.  Atach vs Sinus tachycardia to 170s post now. Now Hrs sinus at 60s-90s.  Asymptomatic. Euvolemic.   Optimize pain, volume status etc.  c/w Coreg 3.125mg BID  - non urgent TTE can be outpt if pt d/c home prior to monday.

## 2023-02-04 NOTE — DISCHARGE NOTE NURSING/CASE MANAGEMENT/SOCIAL WORK - NSDCFUADDAPPT_GEN_ALL_CORE_FT
Please call [phone] to confirm a follow up appointment with Dr. Horvath    Please follow up with cardiologist Dr. Nguyen regarding your tachycardia this admission. You will need to obtain an echocardiogram outpatient. Please call [phone].     Please follow up with your primary care doctor.

## 2023-02-04 NOTE — OCCUPATIONAL THERAPY INITIAL EVALUATION ADULT - ADDITIONAL COMMENTS
Pt lives with family in elevator apartment with no MIGUEL. Pt reporting that he was able to perform all ADLs/mobility independently prior to admission.

## 2023-02-04 NOTE — DISCHARGE NOTE NURSING/CASE MANAGEMENT/SOCIAL WORK - PATIENT PORTAL LINK FT
You can access the FollowMyHealth Patient Portal offered by [facility] by registering at the following website: [email or web address]. By joining [facility]’s FollowMyHealth portal, you will also be able to view your health information using other applications (apps) compatible with our system.

## 2023-02-04 NOTE — OCCUPATIONAL THERAPY INITIAL EVALUATION ADULT - PERTINENT HX OF CURRENT PROBLEM, REHAB EVAL
37 male admitted to [facility] for elective right L-4-5 laminectomy. Procedure was performed without complications. Primary plan was to discharge patient home from PACU after protocol period of observation. In PACU, patient developed progressive tachycardia and hypertension. He received 5m of lopressor IV, his home dose of lissinopril, and 1L of NS bolus. Both, blood pressure and heart rate responded well but after less than 1 hr heart rate increased above 130. Cardiology was called, there recommendations were implemented and patient is being admitted for observation overnight. Denies pain, weakness, palpitations. He tolerates diet, and past void trial.

## 2023-02-04 NOTE — OCCUPATIONAL THERAPY INITIAL EVALUATION ADULT - MODALITIES TREATMENT COMMENTS
Pt able to ambulate independently and w/o use of AD. Pt with slow and cautious movement, however no deficits observed throughout.

## 2023-02-04 NOTE — DISCHARGE NOTE PROVIDER - NSDCFUADDINST_GEN_ALL_CORE_FT
Neurosurgery follow up appointment date/time:  - You have staples in place they will be removed at your outpatient appointment.   - please call the office to confirm appointment: [phone]    Wound Care:  - You can shower as usual with warm soapy water and pat incision dry with a clean towel.  - Do not apply lotions, creams or ointments to incision.  - No bathing or swimming.   Activity:  - fatigue is common after surgery, rest if you feel tired   - no bending, lifting, twisting or heavy lifting   - walking is recommended, ambulate as tolerated  - you may shower when you get home, keep your incision dry  - no bathing   - no driving within 24 hours of anesthesia or while taking prescription pain medications   - keep hydrated, drink plenty of water     Inpatient consults:  - final recommendations  - you will need follow up with cardiology Dr. Nguyen     Please also follow up with your primary care doctor.     Pain Expectations:  - pain after surgery is expected  - please take pain meds as prescribed   - Tylenol 1-2 tabs every 6 hours as needed  - percocet 1 tab every 6 hours as needed for severe pain.  - Medrol dose karthikeyan follow instructions on packet     Medications:  - STOP lisinopril   - Start coreg 3.125mg twice a day   - adverse affects of meds discussed with patients  - pain medications can cause constipation, you should eat a high fiber diet and may take a stool softener while on pain meds   - Avoid taking Advil (ibuprofen), Motrin (naproxen), or Aspirin for pain as they can cause bleeding     Call the office or come to ED if:  - wound has drainage or bleeding, increased redness or pain at incision site, neurological change, fever (>101), chills, night sweats, syncope, nausea/vomiting      Playback:  - discharge instructions are uploaded to playback.     WITHIN 24 HOURS OF DISCHARGE, PLEASE CONTACT NEURO PA  WITH ANY QUESTIONS OR CONCERNS: [phone]   OTHERWISE, PLEASE CALL THE OFFICE WITH ANY QUESTIONS OR CONCERNS: [phone]

## 2023-02-04 NOTE — DISCHARGE NOTE PROVIDER - NSDCFUADDAPPT_GEN_ALL_CORE_FT
Please call [phone] to confirm a follow up appointment with Dr. Horvath    Please follow up with cardiologist Dr. Nguyen regarding your tachycardia this admission. You will need to obtain an echocardiogram outpatient. Please call [phone].     Please follow up with your primary care doctor.

## 2023-02-04 NOTE — OCCUPATIONAL THERAPY INITIAL EVALUATION ADULT - DIAGNOSIS, OT EVAL
Pt presenting with no neurological deficits and performing all ADL/functional mobility independently at this time
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
## 2023-02-08 DIAGNOSIS — I10 ESSENTIAL (PRIMARY) HYPERTENSION: ICD-10-CM

## 2023-02-08 DIAGNOSIS — S33.141A DISLOCATION OF L4/L5 LUMBAR VERTEBRA, INITIAL ENCOUNTER: ICD-10-CM

## 2023-02-08 DIAGNOSIS — I47.1 SUPRAVENTRICULAR TACHYCARDIA: ICD-10-CM
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
## 2023-02-08 DIAGNOSIS — E78.5 HYPERLIPIDEMIA, UNSPECIFIED: ICD-10-CM

## 2023-02-08 DIAGNOSIS — R00.0 TACHYCARDIA, UNSPECIFIED: ICD-10-CM
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
## 2023-03-02 PROBLEM — M51.26 OTHER INTERVERTEBRAL DISC DISPLACEMENT, LUMBAR REGION: Chronic | Status: ACTIVE | Noted: 2023-02-02

## 2023-03-02 PROBLEM — E78.5 HYPERLIPIDEMIA, UNSPECIFIED: Chronic | Status: ACTIVE | Noted: 2023-02-03

## 2023-03-02 PROBLEM — I10 ESSENTIAL (PRIMARY) HYPERTENSION: Chronic | Status: ACTIVE | Noted: 2023-02-03

## 2023-03-02 PROBLEM — Z00.00 ENCOUNTER FOR PREVENTIVE HEALTH EXAMINATION: Status: ACTIVE | Noted: 2023-03-02

## 2023-03-03 ENCOUNTER — APPOINTMENT (OUTPATIENT)
Dept: HEART AND VASCULAR | Facility: CLINIC | Age: 38
End: 2023-03-03
Payer: COMMERCIAL

## 2023-03-03 ENCOUNTER — NON-APPOINTMENT (OUTPATIENT)
Age: 38
End: 2023-03-03

## 2023-03-03 VITALS
DIASTOLIC BLOOD PRESSURE: 90 MMHG | OXYGEN SATURATION: 98 % | HEART RATE: 92 BPM | TEMPERATURE: 98 F | BODY MASS INDEX: 31.94 KG/M2 | WEIGHT: 241 LBS | SYSTOLIC BLOOD PRESSURE: 140 MMHG | HEIGHT: 73 IN

## 2023-03-03 VITALS — SYSTOLIC BLOOD PRESSURE: 132 MMHG | DIASTOLIC BLOOD PRESSURE: 90 MMHG

## 2023-03-03 DIAGNOSIS — R00.2 PALPITATIONS: ICD-10-CM

## 2023-03-03 DIAGNOSIS — E78.5 HYPERLIPIDEMIA, UNSPECIFIED: ICD-10-CM

## 2023-03-03 DIAGNOSIS — I10 ESSENTIAL (PRIMARY) HYPERTENSION: ICD-10-CM

## 2023-03-03 PROCEDURE — 93000 ELECTROCARDIOGRAM COMPLETE: CPT

## 2023-03-03 PROCEDURE — 99214 OFFICE O/P EST MOD 30 MIN: CPT | Mod: 25

## 2023-03-03 RX ORDER — CARVEDILOL 3.12 MG/1
3.12 TABLET, FILM COATED ORAL TWICE DAILY
Qty: 60 | Refills: 0 | Status: ACTIVE | COMMUNITY
Start: 1900-01-01 | End: 1900-01-01

## 2023-03-03 NOTE — DISCUSSION/SUMMARY
[FreeTextEntry1] : Jhonattan ReyesVargas is a 39yo M with pmhx of HTN, HLD, laminectomy who presents for follow up of tachyarrhythmia.\par \par #tachyarrhythmia \par s/p laminectomy at [facility] in Feb 2023, found to be in ST vs atrial tach to 120s per cardiology consult note; resolved with IVF and IV lopressor. Pt was instructed to continue to take Coreg 3.125 BID\par -pt reports hx of intermittently feeling like he has palpitations, which has improved with Coreg. Notes on days he does not take Coreg, he will feel the palpitations\par -followed with another Cardiologist, who completed nuclear stress test and placed cardiac monitor on pt - does not know results yet\par -recommended patient follow with one Cardiologist to maintain continuity. If the patient continues to follow with us, advised he have the results of the event monitor be sent to us\par -c/w Coreg 3.125mg BID at this time (given 1 mo refill). However, patient notes he occasionally feels like his HR is going too low. Pending results of event monitor, will adjust Coreg accordingly\par -TTE ordered\par \par #HTN\par Reports his PCP started him on Lisinopril 20, which was stopped at [facility] once started on Coreg 3.125 BID\par BP in office today 130/90\par recommended patient check BPs at home, keep a log and bring the log to next visit prior to adjusting BP medications\par \par #HLD\par previously was on simvastatin 20, which he states he stopped taking\par advised patient to obtain records from PCP regarding lipid panel. If unable to obtain, will obtain at next visit\par \par Follow-up 1 month to review test results, BP follow-up if planning to follow-up with us [EKG obtained to assist in diagnosis and management of assessed problem(s)] : EKG obtained to assist in diagnosis and management of assessed problem(s)

## 2023-03-03 NOTE — PHYSICAL EXAM
[Well Developed] : well developed [Well Nourished] : well nourished [No Carotid Bruit] : no carotid bruit [Normal S1, S2] : normal S1, S2 [No Murmur] : no murmur [No Rub] : no rub [Clear Lung Fields] : clear lung fields [Good Air Entry] : good air entry [No Respiratory Distress] : no respiratory distress  [Soft] : abdomen soft [No Edema] : no edema [Moves all extremities] : moves all extremities [No Focal Deficits] : no focal deficits [Normal Speech] : normal speech [Alert and Oriented] : alert and oriented [Normal memory] : normal memory

## 2023-03-03 NOTE — REVIEW OF SYSTEMS
[Palpitations] : palpitations [Fever] : no fever [Headache] : no headache [Blurry Vision] : no blurred vision [SOB] : no shortness of breath [Dyspnea on exertion] : not dyspnea during exertion [Chest Discomfort] : no chest discomfort [Lower Ext Edema] : no extremity edema [Orthopnea] : no orthopnea [Syncope] : no syncope [Cough] : no cough [Abdominal Pain] : no abdominal pain [Joint Pain] : no joint pain [Myalgia] : no myalgia [Dizziness] : no dizziness [Weakness] : no weakness [Easy Bleeding] : no tendency for easy bleeding

## 2023-03-03 NOTE — CARDIOLOGY SUMMARY
[de-identified] : NSR at 83bpm, increased voltage, no acute ST wave changes [de-identified] : Completed with another cardiologist, pending results [de-identified] : Completed with another cardiologist, pending results

## 2023-03-03 NOTE — HISTORY OF PRESENT ILLNESS
[FreeTextEntry1] : Jhonattan ReyesVargas is a 38M with pmhx of HTN, HLD, laminectomy who presents for follow up of tachyarrhythmia. \par \par Pt states he was at [facility] Feb 2023 for an elective laminectomy, which had no complications but post procedure, his HR was found to be in the 150s. Pt was evaluated by Cardiology during admission: ST vs Atrial tach, improved with IVF and Lopressor. Pt was started on Coreg 3.215 mg BID, which he has been compliant with most days, but on days he does not take it, he feels like his heart intermittently races. Pt was then advised to follow up with o/p Cardiology for non urgent TTE.\par \par Pt reports he followed up with another Cardiologist, who completed a nuclear stress test and placed an event monitor on him. Pt does not know the results yet. \par \par Of note, patient followed with PCP 5 months ago because he had two episodes of feeling like his heart was racing, which resolved on its own. During follow up, pt was found to have HLD and HTN. Pt was started on Lisinopril 20 and Simvastatin 20 at that time, which was stopped at [facility] per the patient. Pt states he does not check his BP at home.\par \par Denies chest pain, shortness of breath, dyspnea on exertion, leg swelling. Occasionally feels like he has a slow heart rate and also reports occasional palpitations the times he does not take Coreg.\par \par PMH/PSH: HTN, HLD\par \par FH: father HTN\par SH: laminectomy\par \par Home Meds: Coreg 3.125mg BID\par \par ALL: none\par \par Lifestyle History:\par Diet: increasing vegetables and chicken intake, less rice and bread\par Exercise: Not exercising now \par Smoking: never\par alcohol one time a week\par Stress: Patient denies any stress.

## 2023-07-20 PROCEDURE — 84100 ASSAY OF PHOSPHORUS: CPT

## 2023-07-20 PROCEDURE — 85027 COMPLETE CBC AUTOMATED: CPT

## 2023-07-20 PROCEDURE — 80053 COMPREHEN METABOLIC PANEL: CPT

## 2023-07-20 PROCEDURE — 81003 URINALYSIS AUTO W/O SCOPE: CPT

## 2023-07-20 PROCEDURE — 86900 BLOOD TYPING SEROLOGIC ABO: CPT

## 2023-07-20 PROCEDURE — 36415 COLL VENOUS BLD VENIPUNCTURE: CPT

## 2023-07-20 PROCEDURE — 97161 PT EVAL LOW COMPLEX 20 MIN: CPT

## 2023-07-20 PROCEDURE — 82570 ASSAY OF URINE CREATININE: CPT

## 2023-07-20 PROCEDURE — 86850 RBC ANTIBODY SCREEN: CPT

## 2023-07-20 PROCEDURE — 86901 BLOOD TYPING SEROLOGIC RH(D): CPT

## 2023-07-20 PROCEDURE — C1889: CPT

## 2023-07-20 PROCEDURE — 83735 ASSAY OF MAGNESIUM: CPT

## 2023-07-20 PROCEDURE — 97165 OT EVAL LOW COMPLEX 30 MIN: CPT

## 2023-07-20 PROCEDURE — 84300 ASSAY OF URINE SODIUM: CPT

## 2023-07-20 PROCEDURE — 84443 ASSAY THYROID STIM HORMONE: CPT

## 2023-07-20 PROCEDURE — 76000 FLUOROSCOPY <1 HR PHYS/QHP: CPT

## 2023-07-20 PROCEDURE — 80048 BASIC METABOLIC PNL TOTAL CA: CPT

## 2023-07-20 PROCEDURE — 83935 ASSAY OF URINE OSMOLALITY: CPT

## (undated) DEVICE — NDL HYPO SAFE 22G X 1.5" (BLACK)

## (undated) DEVICE — MARKING PEN W RULER

## (undated) DEVICE — PREP CHLORAPREP HI-LITE ORANGE 26ML

## (undated) DEVICE — MIDAS REX LEGEND MATCH HEAD FLUTED LG BORE 3.0MM X 14CM

## (undated) DEVICE — ELCTR AQUAMANTYS BIPOLAR SEALER 6.0

## (undated) DEVICE — SUT VICRYL 0 18" CT-1 UNDYED (POP-OFF)

## (undated) DEVICE — DRAPE C ARM 41X74"

## (undated) DEVICE — PACK SPINE

## (undated) DEVICE — BIPOLAR FORCEP SYMMETRY BAYONET 7" X 1.5MM SMOOTH (SILVER)

## (undated) DEVICE — SYR CONTROL LUER LOK 10CC

## (undated) DEVICE — MIDAS REX LEGEND BALL FLUTED SM BORE 6.0MM X 10CM

## (undated) DEVICE — DRSG TELFA 3 X 8

## (undated) DEVICE — SYR LUER LOK 20CC

## (undated) DEVICE — DRSG TEGADERM 4X4.75"

## (undated) DEVICE — DRAPE LIGHT HANDLE COVER (GREEN)

## (undated) DEVICE — NDL SPINAL 18G X 3.5" (PINK)

## (undated) DEVICE — STAPLER SKIN PROXIMATE

## (undated) DEVICE — VAGINAL PACKING 2"

## (undated) DEVICE — DRSG DERMABOND 0.7ML

## (undated) DEVICE — GLV 7.5 PROTEXIS (WHITE)

## (undated) DEVICE — VENODYNE/SCD SLEEVE CALF MEDIUM

## (undated) DEVICE — DRAPE INSTRUMENT POUCH 6.75" X 11"